# Patient Record
Sex: MALE | Race: WHITE | NOT HISPANIC OR LATINO | Employment: OTHER | URBAN - METROPOLITAN AREA
[De-identification: names, ages, dates, MRNs, and addresses within clinical notes are randomized per-mention and may not be internally consistent; named-entity substitution may affect disease eponyms.]

---

## 2017-04-25 ENCOUNTER — HOSPITAL ENCOUNTER (EMERGENCY)
Facility: HOSPITAL | Age: 37
Discharge: PSYCHIATRIC OR SUBSTANCE USE FACILITY | End: 2017-04-25
Attending: EMERGENCY MEDICINE | Admitting: EMERGENCY MEDICINE
Payer: COMMERCIAL

## 2017-04-25 VITALS
HEIGHT: 74 IN | TEMPERATURE: 98 F | SYSTOLIC BLOOD PRESSURE: 122 MMHG | OXYGEN SATURATION: 98 % | WEIGHT: 160 LBS | RESPIRATION RATE: 16 BRPM | HEART RATE: 80 BPM | DIASTOLIC BLOOD PRESSURE: 80 MMHG | BODY MASS INDEX: 20.53 KG/M2

## 2017-04-25 DIAGNOSIS — F32.A DEPRESSION WITH SUICIDAL IDEATION: Primary | ICD-10-CM

## 2017-04-25 DIAGNOSIS — R45.851 DEPRESSION WITH SUICIDAL IDEATION: Primary | ICD-10-CM

## 2017-04-25 DIAGNOSIS — F10.24 ALCOHOL DEPENDENCE WITH ALCOHOL-INDUCED MOOD DISORDER: ICD-10-CM

## 2017-04-25 LAB
ALBUMIN SERPL BCP-MCNC: 3.9 G/DL
ALP SERPL-CCNC: 88 U/L
ALT SERPL W/O P-5'-P-CCNC: 10 U/L
AMORPH CRY UR QL COMP ASSIST: ABNORMAL
AMPHET+METHAMPHET UR QL: ABNORMAL
ANION GAP SERPL CALC-SCNC: 12 MMOL/L
APAP SERPL-MCNC: <3 UG/ML
AST SERPL-CCNC: 13 U/L
BACTERIA #/AREA URNS AUTO: ABNORMAL /HPF
BARBITURATES UR QL SCN>200 NG/ML: NEGATIVE
BASOPHILS # BLD AUTO: 0.04 K/UL
BASOPHILS NFR BLD: 0.5 %
BENZODIAZ UR QL SCN>200 NG/ML: ABNORMAL
BILIRUB SERPL-MCNC: 0.3 MG/DL
BILIRUB UR QL STRIP: NEGATIVE
BUN SERPL-MCNC: 11 MG/DL
BZE UR QL SCN: ABNORMAL
CALCIUM SERPL-MCNC: 9.3 MG/DL
CANNABINOIDS UR QL SCN: NEGATIVE
CHLORIDE SERPL-SCNC: 107 MMOL/L
CLARITY UR REFRACT.AUTO: ABNORMAL
CO2 SERPL-SCNC: 23 MMOL/L
COLOR UR AUTO: ABNORMAL
CREAT SERPL-MCNC: 1.4 MG/DL
CREAT UR-MCNC: 413 MG/DL
DIFFERENTIAL METHOD: ABNORMAL
EOSINOPHIL # BLD AUTO: 0.2 K/UL
EOSINOPHIL NFR BLD: 2.2 %
ERYTHROCYTE [DISTWIDTH] IN BLOOD BY AUTOMATED COUNT: 13 %
EST. GFR  (AFRICAN AMERICAN): >60 ML/MIN/1.73 M^2
EST. GFR  (NON AFRICAN AMERICAN): >60 ML/MIN/1.73 M^2
ETHANOL SERPL-MCNC: 25 MG/DL
GLUCOSE SERPL-MCNC: 114 MG/DL
GLUCOSE UR QL STRIP: NEGATIVE
HCT VFR BLD AUTO: 40.9 %
HGB BLD-MCNC: 13.6 G/DL
HGB UR QL STRIP: NEGATIVE
HYALINE CASTS UR QL AUTO: 48 /LPF
KETONES UR QL STRIP: NEGATIVE
LEUKOCYTE ESTERASE UR QL STRIP: NEGATIVE
LYMPHOCYTES # BLD AUTO: 3.1 K/UL
LYMPHOCYTES NFR BLD: 35.7 %
MCH RBC QN AUTO: 29.7 PG
MCHC RBC AUTO-ENTMCNC: 33.3 %
MCV RBC AUTO: 89 FL
METHADONE UR QL SCN>300 NG/ML: NEGATIVE
MICROSCOPIC COMMENT: ABNORMAL
MONOCYTES # BLD AUTO: 0.7 K/UL
MONOCYTES NFR BLD: 7.8 %
NEUTROPHILS # BLD AUTO: 4.6 K/UL
NEUTROPHILS NFR BLD: 53.6 %
NITRITE UR QL STRIP: NEGATIVE
OPIATES UR QL SCN: ABNORMAL
PCP UR QL SCN>25 NG/ML: NEGATIVE
PH UR STRIP: 5 [PH] (ref 5–8)
PLATELET # BLD AUTO: 213 K/UL
PMV BLD AUTO: 10.1 FL
POTASSIUM SERPL-SCNC: 3.8 MMOL/L
PROT SERPL-MCNC: 7.7 G/DL
PROT UR QL STRIP: ABNORMAL
RBC # BLD AUTO: 4.58 M/UL
RBC #/AREA URNS AUTO: 1 /HPF (ref 0–4)
SALICYLATES SERPL-MCNC: <5 MG/DL
SODIUM SERPL-SCNC: 142 MMOL/L
SP GR UR STRIP: 1.02 (ref 1–1.03)
SQUAMOUS #/AREA URNS AUTO: 0 /HPF
TOXICOLOGY INFORMATION: ABNORMAL
TSH SERPL DL<=0.005 MIU/L-ACNC: 2.69 UIU/ML
URN SPEC COLLECT METH UR: ABNORMAL
UROBILINOGEN UR STRIP-ACNC: NEGATIVE EU/DL
WBC # BLD AUTO: 8.54 K/UL
WBC #/AREA URNS AUTO: 5 /HPF (ref 0–5)

## 2017-04-25 PROCEDURE — 84443 ASSAY THYROID STIM HORMONE: CPT

## 2017-04-25 PROCEDURE — 99285 EMERGENCY DEPT VISIT HI MDM: CPT | Mod: 25

## 2017-04-25 PROCEDURE — 85025 COMPLETE CBC W/AUTO DIFF WBC: CPT

## 2017-04-25 PROCEDURE — 82570 ASSAY OF URINE CREATININE: CPT

## 2017-04-25 PROCEDURE — 80307 DRUG TEST PRSMV CHEM ANLYZR: CPT

## 2017-04-25 PROCEDURE — 93010 ELECTROCARDIOGRAM REPORT: CPT | Mod: ,,, | Performed by: INTERNAL MEDICINE

## 2017-04-25 PROCEDURE — 80307 DRUG TEST PRSMV CHEM ANLYZR: CPT | Mod: 59

## 2017-04-25 PROCEDURE — 80053 COMPREHEN METABOLIC PANEL: CPT

## 2017-04-25 PROCEDURE — 80329 ANALGESICS NON-OPIOID 1 OR 2: CPT

## 2017-04-25 PROCEDURE — 80320 DRUG SCREEN QUANTALCOHOLS: CPT

## 2017-04-25 PROCEDURE — 93005 ELECTROCARDIOGRAM TRACING: CPT

## 2017-04-25 PROCEDURE — 81001 URINALYSIS AUTO W/SCOPE: CPT

## 2017-04-25 PROCEDURE — 99285 EMERGENCY DEPT VISIT HI MDM: CPT | Mod: ,,, | Performed by: EMERGENCY MEDICINE

## 2017-04-25 RX ORDER — IBUPROFEN 200 MG
1 TABLET ORAL DAILY
Status: DISCONTINUED | OUTPATIENT
Start: 2017-04-25 | End: 2017-04-25 | Stop reason: HOSPADM

## 2017-04-25 NOTE — ED NOTES
Patient upset when it was explained that the doctor has decided not to discharge him.  Patient raised voice and became frustrated.  PEC process explained and staff re-assured patient.

## 2017-04-25 NOTE — ED NOTES
PEC received in [facility] at 0128. Will begin seeking inpatient psychiatric placement.

## 2017-04-25 NOTE — ED NOTES
Patient transferred to [facility] at approximately 0552. Transported via wheelchair with RN and security. 2 bags of belongings received upon arrival. Glasses and bible at bedside. Valuables secured in locket patient closet.

## 2017-04-25 NOTE — ED PROVIDER NOTES
"Encounter Date: 4/24/2017    SCRIBE #1 NOTE: I, Carlene Tres, am scribing for, and in the presence of, Dr. Dietrich.       History     Chief Complaint   Patient presents with    Suicidal     Review of patient's allergies indicates:  Allergies not on file  HPI Comments: Time patient was seen by the provider: 12:25 AM      The patient is a 36 y.o. male with no significant medical hx that presents to the ED with a complaint of depression/SI. He endorses 2 weeks of "random" suidical thoughts and states times where "I don't care if I live or die." He states that he has a 6 yo son and "doesn't have the balls to (kill himself)". Pt was driving from [place] today after spending the weekend with his son and considered driving off the side of the road. He reports recent divorce. He reports stress related to this, money, and his roommate situation. He denies any plan. He states that he feels "bleh", and "really really high" or "really really low." He reports low energy and doesn't want to get out of bed. Reports alcohol consumption (currently intoxicated) and opiate usage and he started an IOP at [facility] last week. He states that he is an alcoholic and drinks every day ("some nights 3-4 beers, some nights a case").  Pt states that he saw a psychiatrist several years ago who thought he might have bipolar. He reports hx of hallucinations when taking methamphetamine but denies any recent AH/VH. Pt has no physical complaints.           The history is provided by the patient.     No past medical history on file.  No past surgical history on file.  No family history on file.  Social History   Substance Use Topics    Smoking status: Not on file    Smokeless tobacco: Not on file    Alcohol use Not on file     Review of Systems   Constitutional: Negative for fever.   HENT: Negative for nosebleeds.    Eyes: Negative for visual disturbance.   Respiratory: Negative for shortness of breath.    Cardiovascular: Negative for chest " pain.   Gastrointestinal: Negative for vomiting.   Genitourinary: Negative for hematuria.   Musculoskeletal: Negative for neck stiffness.   Skin: Negative for rash.   Neurological: Negative for seizures.   Psychiatric/Behavioral: Negative for hallucinations.        Depression, SI.       Physical Exam   Initial Vitals   BP Pulse Resp Temp SpO2   04/24/17 2352 04/24/17 2352 04/24/17 2352 04/24/17 2352 04/24/17 2352   126/76 90 16 98 °F (36.7 °C) 97 %     Physical Exam    Nursing note and vitals reviewed.  Constitutional: He appears well-developed and well-nourished. He is not diaphoretic. No distress.   EtOH on breath   HENT:   Head: Normocephalic and atraumatic.   Mouth/Throat: Oropharynx is clear and moist.   Eyes: Pupils are equal, round, and reactive to light.   Neck: Normal range of motion. Neck supple. No JVD present.   Cardiovascular: Normal rate, regular rhythm, normal heart sounds and intact distal pulses.   Pulmonary/Chest: Breath sounds normal. No respiratory distress. He has no wheezes. He has no rhonchi. He has no rales.   Abdominal: Soft. He exhibits no distension. There is no tenderness.   Musculoskeletal: Normal range of motion. He exhibits no edema.   Lymphadenopathy:     He has no cervical adenopathy.   Neurological: He is alert and oriented to person, place, and time. He has normal strength. No cranial nerve deficit or sensory deficit.   Skin: Skin is warm and dry.   Psychiatric:   Sad, depressed. Active suicidal thoughts with visualizations of suicide (i.e. Driving a car off the road. No HI, AH, VH. Logical thought processes and good insight.         ED Course   Procedures  Labs Reviewed   CBC W/ AUTO DIFFERENTIAL - Abnormal; Notable for the following:        Result Value    RBC 4.58 (*)     Hemoglobin 13.6 (*)     All other components within normal limits   COMPREHENSIVE METABOLIC PANEL - Abnormal; Notable for the following:     Glucose 114 (*)     All other components within normal limits    URINALYSIS - Abnormal; Notable for the following:     Appearance, UA Hazy (*)     Protein, UA 1+ (*)     All other components within normal limits   DRUG SCREEN PANEL, URINE EMERGENCY - Abnormal; Notable for the following:     Creatinine, Random Ur 413.0 (*)     All other components within normal limits   ALCOHOL,MEDICAL (ETHANOL) - Abnormal; Notable for the following:     Alcohol, Medical, Serum 25 (*)     All other components within normal limits   ACETAMINOPHEN LEVEL - Abnormal; Notable for the following:     Acetaminophen (Tylenol), Serum <3.0 (*)     All other components within normal limits   SALICYLATE LEVEL - Abnormal; Notable for the following:     Salicylate Lvl <5.0 (*)     All other components within normal limits   URINALYSIS MICROSCOPIC - Abnormal; Notable for the following:     Hyaline Casts, UA 48 (*)     All other components within normal limits   TSH             Medical Decision Making:   History:   Old Medical Records: I decided to obtain old medical records.  Initial Assessment:   This is a 36 y.o.male patient with presentation of depression and suicidal ideation.  Pt has no prior history of psychiatric illness. Pt is currently not on psychiatric medication. Patient denies homicidal ideation, admits to active suicidal ideation. Pt denies any medical problems or complaints at this time. Plan for medical clearance labs, EKG, PEC, one-to-one observation.    Labs reviewed were remarkable for UDS positivity in multiple drugs including methamphetamine, cocaine. EKG independently interpreted by me was unremarkable. Pt is medically cleared for psychiatric evaluation.    Clinical Tests:   Lab Tests: Ordered and Reviewed            Scribe Attestation:   Scribe #1: I performed the above scribed service and the documentation accurately describes the services I performed. I attest to the accuracy of the note.    Attending Attestation:           Physician Attestation for Scribe:  Physician Attestation  Statement for Scribe #1: I, Dr. Dietrich, reviewed documentation, as scribed by in my presence, and it is both accurate and complete.                 ED Course     Clinical Impression:   The primary encounter diagnosis was Depression with suicidal ideation. A diagnosis of Alcohol dependence with alcohol-induced mood disorder was also pertinent to this visit.    Disposition:   Disposition: Transferred  Condition: Stable       Barrera Dietrich MD  04/27/17 1000

## 2017-04-25 NOTE — ED NOTES
Pec faxed to [facility] [place] [facility], [facility] , [facility]. [facility], [facility],  [facility], [place] [facility],  [facility], [place], [facility], [facility], [facility], [facility],  [facility], [facility], [facility], [facility], [facility], [facility], [facility], [facility], [facility],  [facility].

## 2017-04-25 NOTE — ED NOTES
Patient would like mother, Katie Comer, to be notified once placed to psychiatric facility. Mother's phone number is [phone]

## 2017-04-25 NOTE — CONSULTS
"Psychiatry ED Consult Note    2017 8:31 AM  Chuckie Hanna  MRN: [id number]    Chief Complaint / Reason for Consult: suicidal ideation and substance abuse     History of Present Illness:   Chuckie Hanna is a 36 y.o. male with past psychiatric history of Alcohol Use Disorder and Opiate Use Disorder who presented to the [facility] ED due to suicidal ideation and polysubstance intoxication.  Psychiatry was originally consulted to address the patient's symptoms of suicidal ideation.     On interview, pt is A&O x 3, linear and cooperative with exam.  Pt states he has been trying to maintain sobriety through [facility] and Suboxone use.  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  Pt states everything that could go wrong yesterday, did -- his phone  and he needed a new battery, he got a flat tire on his car, he got into an argument with the , all of which ultimately led him to relapse on drinking.  After finally returning to [place], pt's friend, Álvaro Moyer convinced him to go to the ED for further help.  Pt presented to ED alone expressing suicidal ideation with plan to drive off a bridge.  Pt did not admit to using other substances yesterday, but is chronically on suboxone treatment (utox + cocaine, benzodiazepines, opiates, amphetamines and BAL was 25 on presentation).  Pt states he got "loaded" and doesn't remember how much he drank, but was drinking most of the afternoon and called his ex-wife, mother and friend Álvaro Moyer several times.  He denies being suicidal, but states he contemplated "would my ex wife and child be better off with out me around?"  Later in the day the patient admitted to stealing and selling his roommate's two televisions to pay for the new battery in his phone.  He denies feeling depressed, but had a really bad day yesterday.  He does admit to decreased sleep and energy, but denies SI, HI, AVH and other symptoms of depression, francisca or " "psychosis.  He denies other medication use besides Suboxone and reports being sober from alcohol since November, 2016 before yesterday.    No chart review is available, but pt denies h/o previous inpatient psychiatric admissions.  He goes to an [facility] for substance use and sees both a medical physician and psychiatrist there.  Denies prior suicide attempts, denies access to a gun and previously has taken Gabapentin for anxiety.      Collateral:   Spoke with patient's friend, Álvaro Moyer, [phone] who states patient was most likely high on multiple substances yesterday, as he called Mr. Moyer numerous times, while intoxicated and driving yesterday.  He also reports that pt recently got out of detox 3 weeks ago and immediately relapsed, although he lied about it to family and friends..  Pt has a history of cocaine, heroin and alcohol use according to Mr. Moyer.  "Matt stole those TVs yesterday so he could go buy a gun and shoot himself, he told me as much."  Mr. Moyer does not feel that the pt is safe to be discharged as he was drinking and driving yesterday and is either going to kill himself or harm other people in the process.  "He needs to go inpatient, he's burned a lot of bridges and doesn't have many places left to go.  His mom is worried, his ex wife and child are worried, I'm worried."      SUBJECTIVE:     Medical Review Of Systems:  Pertinent items are noted in HPI.    Psychiatric Review Of Systems - Is patient experiencing or having changes in:  sleep: yes  appetite: no  weight: no  energy/anergy: yes  interest/pleasure/anhedonia: no   somatic symptoms: no  libido: no  anxiety/panic: yes  guilty/hopelessness: yes  concentration: yes  S.I.B.s/risky behavior: yes  any drugs: yes  alcohol: yes     No past medical history on file.    No past surgical history on file.    Social History     Social History    Marital status:      Spouse name: N/A    Number of children: N/A    Years of " education: N/A     Social History Main Topics    Smoking status: Not on file    Smokeless tobacco: Not on file    Alcohol use Not on file    Drug use: Not on file    Sexual activity: Not on file     Other Topics Concern    Not on file     Social History Narrative       Current Facility-Administered Medications   Medication Dose Route Frequency Provider Last Rate Last Dose    nicotine 14 mg/24 hr 1 patch  1 patch Transdermal Daily Barrera Dietrich MD         No current outpatient prescriptions on file.       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  Education: college graduate  Special Ed: unknown  Housing Status: lives with roommate in [place]  History of phys/sexual abuse: no  Access to gun: denies    Substance Abuse History:  Recreational Drugs: on suboxone and relapsed on alcohol, denied other use; utox +cocaine, opiates, bdz, & amphetamines  Use of Alcohol: reports sobriety since 11/2016; intoxicated yesterday, per friends, pt frequently relapse  Rehab History:yes   Tobacco Use:yes  Legal consequences of chemical use: yes and several DUIs - one felony DUI charge    Legal History:  Past Charges/Incarcerations:yes, several DUIs   Pending charges:no     Psychosocial Stressors: financial, marital and drug and alcohol.   Functioning Relationships: strained with spouse or significant others and good relationship with parents - although strained d/t pt's substance use    Psychosocial Factors:  Maladaptive or problem behaviors: substance use  Peer group, social, ethic, cultural,  emotional, and health factors: AA, [facility], [facility]  Living situation, family constellation, family circumstances/home: lives with roommate in [facility] [place] [facility]  Recovery environment: plans to return to [facility],   Community resources used by patient: AA/[facility]LLY  Treatment acceptance/motivation for change: reports motivated, despite recurrent relapse    Is the patient aware of the biomedical complications associated with substance abuse and mental illness? yes    Does the patient have an Advance Directive for Mental Health treatment? no   - if yes, inform patient to bring copy.    Family Psychiatric History:   Father - substance use    OBJECTIVE:     Vitals:    04/25/17 0758   BP: 122/80   Pulse: 80   Resp: 16   Temp: 97.6 °F (36.4 °C)           Recent Labs  Lab 04/25/17  0103   *   CALCIUM 9.3   ALBUMIN 3.9   PROT 7.7      K 3.8   CO2 23      BUN 11   CREATININE 1.4   ALKPHOS 88   ALT 10   AST 13   BILITOT 0.3     Lab Results   Component Value Date    WBC 8.54 04/25/2017    HGB 13.6 (L) 04/25/2017    HCT 40.9 04/25/2017    MCV 89 04/25/2017     04/25/2017     Lab Results   Component Value Date     04/25/2017    BUN 11 04/25/2017    CREATININE 1.4 04/25/2017    TSH 2.695 04/25/2017    WBC 8.54 04/25/2017     No results found for: PHENYTOIN, PHENOBARB, VALPROATE, CBMZ  Urinalysis    Recent Labs  Lab 04/25/17  0243   COLORU Loretta   SPECGRAV 1.020   PHUR 5.0   PROTEINUA 1+*   BACTERIA Rare   NITRITE Negative   LEUKOCYTESUR Negative   UROBILINOGEN Negative   HYALINECASTS 48*     No results for input(s): POCTGLUCOSE in the last 72 hours.      Mental Status Exam:  Appearance: unremarkable, age appropriate, disheveled, thin & gaunt looking  Behavior/Cooperation: normal, cooperative, restless and fidgety   Speech: normal tone, normal rate, normal pitch, normal volume  Mood: better today - embarrassed  Affect: reactive, full range  Thought Process: normal and logical  Thought Content: denies SI, HI,  JAIDA at present, suspect minimization of symptoms as he was recently suicidal with plan  Sensorium: grossly intact  Cognition: grossly intact  Insight: limited  Judgment: limited, but appropriate for situation    ASSESSMENT/PLAN:     Chuckie Hanna is a 36 y.o. male with:    Substance Induced Mood Disorder  R/O Opiate Abuse  Cocaine Abuse  Amphetamine Abuse  Alcohol Use Disorder  Benzodiazepine Abuse    Recommendations:    1. Dispo: Continue PEC for danger to self & others and seek inpatient hospitalization for further stabilization once medically clear.  No beds available on [facility], pt has been accepted to outside facility, will arrange for transfer  2. Medication Recommendations: Defer to inpatient treatment teams  3. PRN Recommendations: None at this time  4. Legal Status/Precautions: PEC  5. Follow up/Return to ED: N/A  6. Case discussed with:  Dr. Sherif Brown      The above recommendations will be discussed with staff psychiatrist, Dr. Brown.    Maggie Barrett D.O.  [facility]  [phone]    4/25/2017  9:03 AM

## 2017-04-25 NOTE — ED NOTES
Dr. Barrett informed of possible discharges on [facility].  Instructed to continue with placement to [facility].

## 2017-04-25 NOTE — ED NOTES
Report received from [facility] nurse.  Patient resting quietly in bed with eyes closed with even and unlabored respirations.  No distress noted.

## 2017-04-25 NOTE — ED NOTES
Patient accepted by [facility]ie at [facility] for the service of Dr. Roper.  Report to be called to [phone].  Request made to wait until 0930 to call report.

## 2017-10-17 ENCOUNTER — HOSPITAL ENCOUNTER (EMERGENCY)
Facility: OTHER | Age: 37
Discharge: HOME | End: 2017-10-17
Attending: EMERGENCY MEDICINE
Payer: MEDICAID

## 2017-10-17 VITALS
HEIGHT: 74 IN | RESPIRATION RATE: 16 BRPM | OXYGEN SATURATION: 99 % | BODY MASS INDEX: 22.46 KG/M2 | TEMPERATURE: 99 F | WEIGHT: 175 LBS | DIASTOLIC BLOOD PRESSURE: 85 MMHG | HEART RATE: 86 BPM | SYSTOLIC BLOOD PRESSURE: 139 MMHG

## 2017-10-17 DIAGNOSIS — T40.1X1A ACCIDENTAL OVERDOSE OF HEROIN, INITIAL ENCOUNTER: Primary | ICD-10-CM

## 2017-10-17 PROCEDURE — 99283 EMERGENCY DEPT VISIT LOW MDM: CPT

## 2017-10-17 NOTE — ED NOTES
LOC: The patient is awake, alert and aware of environment with an appropriate affect, the patient is oriented x 3 and speaking appropriately.  APPEARANCE: Patient resting comfortably and in no acute distress, patient is clean and well groomed, patient's clothing is properly fastened.  SKIN: The skin is warm and dry, patient has normal skin turgor and moist mucus membranes, skin intact, no breakdown or brusing noted.  MUSKULOSKELETAL: Patient moving all extremities well, no obvious swelling or deformities noted.  RESPIRATORY: Airway is open and patent, respirations are spontaneous, patient has a normal effort and rate. Breath sounds are clear and equal bilaterally.  CARDIAC: Normal heart sounds. No peripheral edema.  ABDOMEN: Soft and non tender to palpation, no distention noted.

## 2017-10-17 NOTE — ED PROVIDER NOTES
"Encounter Date: 10/17/2017    SCRIBE #1 NOTE: I, Herberth Anguiano, am scribing for, and in the presence of, Dr. Pathak.       History     Chief Complaint   Patient presents with    heroin overdose     pt was found down by ems and given narcan .     5:41 PM    Patient is a 37 y.o. male with a history of heroin and alcohol abuse who presents to the ED via EMS with complaint of heroin overdose, which occurred prior to arrival. Per EMS, the patient was found unresponsive and not breathing, and was given narcan 1mg with improvement. Patient admits to injecting heroin. He states "I had been clean for a while, but I lost my job last week and relapsed earlier this week."  He states "I did heroin yesterday, and did some more today and apparently overdosed." He denies use of alcohol or any other drugs. He attends AA. He was staying at [facility] recently, and reports being given Seroquel, but is not currently taking any prescription medications. He is no longer living at [facility], but rather an apartment, but is concerned that he is going to be evicted and become homeless as a result of current overdose. He reports no major medical problems.       The history is provided by the patient and the EMS personnel.     Review of patient's allergies indicates:  No Known Allergies  Past Medical History:   Diagnosis Date    Drug abuse     ETOH abuse      History reviewed. No pertinent surgical history.  History reviewed. No pertinent family history.  Social History   Substance Use Topics    Smoking status: Not on file    Smokeless tobacco: Not on file    Alcohol use Yes     Review of Systems   Constitutional: Negative for fever.   HENT: Negative for sore throat.    Respiratory: Negative for shortness of breath.    Cardiovascular: Negative for chest pain.   Gastrointestinal: Negative for nausea.   Genitourinary: Negative for dysuria.   Musculoskeletal: Negative for back pain.   Skin: Negative for rash.   Neurological: " Negative for weakness.   Hematological: Does not bruise/bleed easily.   Psychiatric/Behavioral: Negative for confusion.       Physical Exam     Initial Vitals [10/17/17 1727]   BP Pulse Resp Temp SpO2   (!) 135/94 (!) 111 16 98.4 °F (36.9 °C) --      MAP       107.67         Physical Exam    Nursing note and vitals reviewed.  Constitutional: He appears well-developed and well-nourished.   Awake but drowsy. Mentation normal.   HENT:   Head: Normocephalic and atraumatic.   Mildly dry mucous membranes.   Eyes: Conjunctivae are normal.   Pupils are 2 mm and sluggish.   Neck: Normal range of motion. Neck supple.   Cardiovascular: Normal rate, regular rhythm and normal heart sounds. Exam reveals no gallop and no friction rub.    No murmur heard.  Pulmonary/Chest: Breath sounds normal. No respiratory distress. He has no wheezes. He has no rhonchi. He has no rales.   Musculoskeletal: Normal range of motion. He exhibits no edema.   Neurological: He is alert and oriented to person, place, and time. He has normal strength. No cranial nerve deficit or sensory deficit.   Skin: Skin is warm and dry.   Recent puncture wounds in left AC.   Psychiatric: He has a normal mood and affect. His behavior is normal.         ED Course   Procedures  Labs Reviewed - No data to display          Medical Decision Making:   ED Management:  8:00 PM - Patient observed for almost 2.5 hours. He remains alert with normal respiratory drive. His friend is at bedside, works at [employer], and will help facilitate getting the patient into rehab tomorrow. Inquired about a PEC, but explained to friend that this may have been self harmful behavior, but not suicidal.            Scribe Attestation:   Scribe #1: I performed the above scribed service and the documentation accurately describes the services I performed. I attest to the accuracy of the note.            ED Course    I personally performed the history, physical exam and medical decision making; the  documentation recorded by the scribe accurately reflects the service I performed and the decisions made by me.        Pt via EMS after IV heroin use, found w/ decre resp drive, PPPs, given narcan.  Upon arrival to the ER he is drowsy but alert observed for several hours no return to obtunded state maintaining his oxygen saturation respiratory drive he has friends at bedside hoping to help him with the sobriety/rehabilitation.  Discharge with [facility].  Clinical Impression:     1. Accidental overdose of heroin, initial encounter                                 Donny Pathak II, MD  10/19/17 1922

## 2017-10-18 NOTE — ED NOTES
Bedside report received from RUSSELL Mock. Pt resting on stretcher, respirations even and unlabored, pt in no distress. Will continue to monitor.